# Patient Record
Sex: FEMALE | Race: BLACK OR AFRICAN AMERICAN | Employment: STUDENT | URBAN - METROPOLITAN AREA
[De-identification: names, ages, dates, MRNs, and addresses within clinical notes are randomized per-mention and may not be internally consistent; named-entity substitution may affect disease eponyms.]

---
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
## 2024-02-21 ENCOUNTER — OFFICE VISIT (OUTPATIENT)
Dept: INTERNAL MEDICINE CLINIC | Facility: CLINIC | Age: 19
End: 2024-02-21

## 2024-02-21 VITALS
BODY MASS INDEX: 43.06 KG/M2 | DIASTOLIC BLOOD PRESSURE: 77 MMHG | SYSTOLIC BLOOD PRESSURE: 116 MMHG | HEART RATE: 86 BPM | WEIGHT: 234 LBS | HEIGHT: 62 IN

## 2024-02-21 DIAGNOSIS — E03.8 SUBCLINICAL HYPOTHYROIDISM: ICD-10-CM

## 2024-02-21 DIAGNOSIS — Z23 NEED FOR VACCINATION: ICD-10-CM

## 2024-02-21 DIAGNOSIS — Z00.00 PHYSICAL EXAM, ANNUAL: Primary | ICD-10-CM

## 2024-02-21 DIAGNOSIS — D50.8 IRON DEFICIENCY ANEMIA SECONDARY TO INADEQUATE DIETARY IRON INTAKE: ICD-10-CM

## 2024-02-21 PROCEDURE — 3008F BODY MASS INDEX DOCD: CPT | Performed by: INTERNAL MEDICINE

## 2024-02-21 PROCEDURE — 90471 IMMUNIZATION ADMIN: CPT | Performed by: INTERNAL MEDICINE

## 2024-02-21 PROCEDURE — 99395 PREV VISIT EST AGE 18-39: CPT | Performed by: INTERNAL MEDICINE

## 2024-02-21 PROCEDURE — 3074F SYST BP LT 130 MM HG: CPT | Performed by: INTERNAL MEDICINE

## 2024-02-21 PROCEDURE — 90716 VAR VACCINE LIVE SUBQ: CPT | Performed by: INTERNAL MEDICINE

## 2024-02-21 PROCEDURE — 3078F DIAST BP <80 MM HG: CPT | Performed by: INTERNAL MEDICINE

## 2024-02-21 NOTE — PROGRESS NOTES
Alina Resendiz is a 18 year old female.  Chief Complaint   Patient presents with    Physical       HPI:   Pt comes for her annual physical   C/c annual physical   C/o still trying to find a therapist   She is not sure if she has heavy periods and not sure if she gets clots \" maybe once in a while \"        HISTORY  2/2023 VISIT   new pt -referred by her mom jeremiah hui   C/c annual physical  C/o cramps with her periods and takes ibuprofen which helps --advised to take it after meals   Gets nervous at times but not panic attacks  ? Triggers   Notices it more in crowds or when she is the center of attention  Not sexually active   Today sometimes sneezes a lot but mom was wondering if she should go for allergy testing and after discussion we will hold off on that for now        Will start college for [employer] , does after school matters      PMH  Prediabetes     No current outpatient medications on file.      Past Medical History:   Diagnosis Date    Anxiety     Depression       No past surgical history on file.   Social History:  Social History     Socioeconomic History    Marital status: Single   Tobacco Use    Smoking status: Never     Passive exposure: Never   Vaping Use    Vaping Use: Never used   Substance and Sexual Activity    Alcohol use: Never    Drug use: Never    Sexual activity: Never   Social History Narrative    Lives with mom and  Sister and older brother 21 years,         Hamster    Guinea pig belong to to sister    fish            Mom works at [employer] for 21 years        REVIEW OF SYSTEMS:   GENERAL HEALTH: No fevers, chills, sweats, fatigue  VISION: No recent vision problems, blurry vision or double vision  HEENT: No decreased hearing ear pain nasal congestion or sore throat  SKIN: denies any unusual skin lesions or rashes  RESPIRATORY: denies shortness of breath, cough, wheezing  CARDIOVASCULAR: denies chest pain on exertion, + palpitations- when nervous ,no swelling in feet  GI: denies abdominal  pain and denies heartburn, nausea or vomiting  : No Pain on urination, change in the color of urine, discharge, urinating frequently  MUS: No back pain, joint pain, muscle pain  NEURO: denies headaches ,+ anxiety> depression    EXAM:   /77 (BP Location: Right arm, Patient Position: Sitting, Cuff Size: large)   Pulse 86   Ht 5' 2\" (1.575 m)   Wt 234 lb (106.1 kg)   LMP 02/01/2024   BMI 42.80 kg/m²   GENERAL: well developed, well nourished,in no apparent distress  SKIN: no rashes,no suspicious lesions  HEENT: atraumatic, normocephalic,ears and throat are clear, no frontal or maxillary sinus tenderness, pupils equal reactive to light bilaterally, extract muscles intact  NECK: supple,no adenopathy, nontender   LUNGS: clear to auscultation, no wheeze  CARDIO: RRR without murmur  GI: good BS's,no masses or tenderness  EXTREMITIES: no cyanosis, or edema    ASSESSMENT AND PLAN:   Diagnoses and all orders for this visit:    Physical exam, annual    Iron deficiency anemia secondary to inadequate dietary iron intake  -     Iron And Tibc [E]; Future  -     Ferritin [E]; Future    Need for vaccination  -     Varicella (Chicken Pox) Vaccine    Subclinical hypothyroidism    Advised patient to watch her sheets exercise, seatbelt use no texting driving, sunscreen use advised  Will recheck labs    Preventive medicine   Labs 12/2023      The patient indicates understanding of these issues and agrees to the plan.  No follow-ups on file.

## 2024-07-08 ENCOUNTER — TELEPHONE (OUTPATIENT)
Dept: INTERNAL MEDICINE CLINIC | Facility: CLINIC | Age: 19
End: 2024-07-08

## 2024-07-08 NOTE — TELEPHONE ENCOUNTER
Fatou at [facility]  is requesting referral.     Name of specialist and specialty department : Behavioral Health Specialist   Reason for visit with the specialist: Suicidal Ideation   Address of the specialist office: N/A   Appointment date: N/A         CSS informed patient the turnaround time for referral is 5-7 business days.  Patient was informed to check their [employer] account for referral status.     Patient is scheduled with Adele Nam 7/13 by Fatou at [facility].

## 2024-07-09 NOTE — TELEPHONE ENCOUNTER
Spoke with Fatou, , patient will be seeing Keiry Nam on 7/13/2024 at [facility].    per Fatou she wants to get the referral going for Behavioral Health Specialist for Suicidal Ideation.  This referral will be as an outpatient.  Please advise

## 2024-07-09 NOTE — TELEPHONE ENCOUNTER
Hello     Behavioral health is self referred. They need to call # on back of insurance card. We do not get authorization for behavioral health.     Thank you,  Victoria  Referral specialist

## 2024-07-10 NOTE — TELEPHONE ENCOUNTER
Patient notified with understanding.  Unable to reach provided extension at [facility].  Patient will contact insurance.

## 2024-08-19 ENCOUNTER — TELEMEDICINE (OUTPATIENT)
Dept: INTERNAL MEDICINE CLINIC | Facility: CLINIC | Age: 19
End: 2024-08-19
Payer: COMMERCIAL

## 2024-08-19 DIAGNOSIS — F32.A DEPRESSION, UNSPECIFIED DEPRESSION TYPE: Primary | ICD-10-CM

## 2024-08-19 RX ORDER — BUPROPION HYDROCHLORIDE 150 MG/1
150 TABLET ORAL DAILY
Qty: 90 TABLET | Refills: 0 | Status: SHIPPED | OUTPATIENT
Start: 2024-08-19

## 2024-08-19 NOTE — PROGRESS NOTES
Patient ID: Alina Resendiz is a 19 year old female.  No chief complaint on file.         HISTORY OF PRESENT ILLNESS:   Patient presents for above.  This visit is conducted using Telemedicine with live, interactive video and audio.  C/c follow up  C/o [facility] therapist adilia hernandezed pt and left msg --azrald like to speak to therapist , she doesn't have one   Feels well without any thoughts of harming herself or others but ran out of her bupropion last week so needs a refill          Review of Systems   MEDICAL HISTORY:     Past Medical History:    Anxiety    Depression       No past surgical history on file.      Current Outpatient Medications:     buPROPion  MG Oral Tablet 24 Hr, Take 1 tablet (150 mg total) by mouth daily., Disp: 90 tablet, Rfl: 0    Allergies:No Known Allergies    Social History     Socioeconomic History    Marital status: Single     Spouse name: Not on file    Number of children: Not on file    Years of education: Not on file    Highest education level: Not on file   Occupational History    Not on file   Tobacco Use    Smoking status: Never     Passive exposure: Never    Smokeless tobacco: Not on file   Vaping Use    Vaping status: Never Used   Substance and Sexual Activity    Alcohol use: Never    Drug use: Never    Sexual activity: Never   Other Topics Concern    Not on file   Social History Narrative    Lives with mom and  Sister and older brother 21 years,         Hamster    Guinea pig belong to to sister    fish            Mom works at [employer] for 21 years     Social Determinants of Health     Financial Resource Strain: Low Risk  (7/3/2024)    Received from [facility]    Overall Financial Resource Strain (CARDIA)     Difficulty of Paying Living Expenses: Not very hard   Food Insecurity: No Food Insecurity (7/3/2024)    Received from [facility]    Hunger Vital Sign     Worried About Running Out of Food in the Last Year: Never true     Ran Out of  Food in the Last Year: Never true   Transportation Needs: No Transportation Needs (7/3/2024)    Received from [facility]    PRAPARE - Transportation     Lack of Transportation (Medical): No     Lack of Transportation (Non-Medical): No   Physical Activity: Not on file   Stress: Not on file   Social Connections: Not on file   Housing Stability: Low Risk  (7/3/2024)    Received from [facility]    Housing Stability Vital Sign     Unable to Pay for Housing in the Last Year: No     Number of Times Moved in the Last Year: 1     Homeless in the Last Year: No       PHYSICAL EXAM:   Unable to perform vitals or do physical exam as this is a virtual video visit.  Patient speaking complete sentences without any conversational dyspnea or respiratory distress  No coughing heard    ASSESSMENT/PLAN:   1. Depression, unspecified depression type  - buPROPion  MG Oral Tablet 24 Hr; Take 1 tablet (150 mg total) by mouth daily.  Dispense: 90 tablet; Refill: 0  - OP REFERRAL TO [facility]    Refer to [facility] since she has a therapist will restart the bupropion but at a lower dose and can always adjust medications as needed and reminded patient to get blood work done that has been ordered since December    No follow-ups on file.    Time spent on encounter  9 minutes   Video time 9 minutes   Documentation time 9 minutes     Alina Resendiz understands video evaluation is not a substitute for face-to-face examination or emergency care. Patient advised to go to ER or call 911 for worsening symptoms or acute distress.     Telehealth outside of [facility]  Telehealth Verbal Consent   I conducted a telehealth visit with Alina Resendiz today, 08/19/24, which was completed using two-way, real-time interactive audio and video communication. This has been done in good ravinder to provide continuity of care in the best interest of the provider-patient relationship, due to the COVID -19  public health crisis/national emergency where restrictions of face-to-face office visits are ongoing. Every conscious effort was taken to allow for sufficient and adequate time to complete the visit.  The patient was made aware of the limitations of the telehealth visit, including treatment limitations as no physical exam could be performed.  The patient was advised to call 911 or to go to the ER in case there was an emergency.  The patient was also advised of the potential privacy & security concerns related to the telehealth platform.   The patient was made aware of where to find [facility]'s notice of privacy practices, telehealth consent form and other related consent forms and documents.  which are located on the [facility] website. The patient verbally agreed to telehealth consent form, related consents and the risks discussed.    Lastly, the patient confirmed that they were in [place].   Included in this visit, time may have been spent reviewing labs, medications, radiology tests and decision making. Appropriate medical decision-making and tests are ordered as detailed in the plan of care above.  Coding/billing information is submitted for this visit based on complexity of care and/or time spent for the visit.    This note was prepared using Dragon Medical voice recognition dictation software. As a result errors may occur. When identified these errors have been corrected. While every attempt is made to correct errors during dictation discrepancies may still exist.    Anabelle Orozco MD  8/19/2024

## 2025-01-28 ENCOUNTER — TELEMEDICINE (OUTPATIENT)
Dept: INTERNAL MEDICINE CLINIC | Facility: CLINIC | Age: 20
End: 2025-01-28
Payer: COMMERCIAL

## 2025-01-28 DIAGNOSIS — M25.531 RIGHT WRIST PAIN: Primary | ICD-10-CM

## 2025-01-28 PROCEDURE — 98004 SYNCH AUDIO-VIDEO EST SF 10: CPT | Performed by: INTERNAL MEDICINE

## 2025-01-28 NOTE — PROGRESS NOTES
Patient ID: Alina Resendiz is a 19 year old female.  No chief complaint on file.         HISTORY OF PRESENT ILLNESS:   Patient presents for above.  This visit is conducted using Telemedicine with live, interactive video and audio.  C/c follow up  C/o right wrist pain x 2 mns   She is right handed   No falls trauma or injury that she is aware of   Worse -lying on it , or rotating it   No numbness or tingling she has not noticed any swelling redness      Review of Systems   Ten point review of systems otherwise negative with the exception of HPI and assessment and plan.  MEDICAL HISTORY:     Past Medical History:    Anxiety    Depression       No past surgical history on file.      Current Outpatient Medications:     buPROPion  MG Oral Tablet 24 Hr, Take 1 tablet (150 mg total) by mouth daily., Disp: 90 tablet, Rfl: 0    Allergies:Allergies[1]    Social History     Socioeconomic History    Marital status: Single     Spouse name: Not on file    Number of children: Not on file    Years of education: Not on file    Highest education level: Not on file   Occupational History    Not on file   Tobacco Use    Smoking status: Never     Passive exposure: Never    Smokeless tobacco: Not on file   Vaping Use    Vaping status: Never Used   Substance and Sexual Activity    Alcohol use: Never    Drug use: Never    Sexual activity: Never   Other Topics Concern    Not on file   Social History Narrative    Lives with mom and  Sister and older brother 21 years,         Hamster    Guinea pig belong to to sister    fish            Mom works at  for 21 years     Social Drivers of Health     Financial Resource Strain: Low Risk  (7/3/2024)    Received from [facility]    Overall Financial Resource Strain (CARDIA)     Difficulty of Paying Living Expenses: Not very hard   Food Insecurity: No Food Insecurity (7/3/2024)    Received from [facility]    Hunger Vital Sign     Worried About  Running Out of Food in the Last Year: Never true     Ran Out of Food in the Last Year: Never true   Transportation Needs: No Transportation Needs (7/3/2024)    Received from [facility]    PRAPARE - Transportation     Lack of Transportation (Medical): No     Lack of Transportation (Non-Medical): No   Physical Activity: Not on file   Stress: Not on file   Social Connections: Not on file   Housing Stability: Low Risk  (7/3/2024)    Received from [facility]    Housing Stability Vital Sign     Unable to Pay for Housing in the Last Year: No     Number of Times Moved in the Last Year: 1     Homeless in the Last Year: No       PHYSICAL EXAM:   Unable to perform vitals or do physical exam as this is a virtual video visit.  Patient appears alert and oriented x 3, no acute distress  Patient speaking complete sentences without any conversational dyspnea or respiratory distress  No coughing heard  Looks like the visit normal in anatomy without any redness or swelling and has good range of motion although she does feel some discomfort    ASSESSMENT/PLAN:   1. Right wrist pain  Ibuprofen 200 mg twice a day after meals for the next 7 to 10 days  Advised to ice it at night  Hold off on xrays   If not better after one week - try a wrist brace x one week   If not better after that then call       No follow-ups on file.    Time spent on encounter  9 minutes   Video time 9 minutes   Documentation time 9 minutes     Alina Resendiz understands video evaluation is not a substitute for face-to-face examination or emergency care. Patient advised to go to ER or call 911 for worsening symptoms or acute distress.     Telehealth outside of [facility]  Telehealth Verbal Consent   I conducted a telehealth visit with Alina Resendiz today, 01/28/25, which was completed using two-way, real-time interactive audio and video communication. This has been done in good ravinder to provide continuity of care in the  best interest of the provider-patient relationship, due to the COVID -19 public health crisis/national emergency where restrictions of face-to-face office visits are ongoing. Every conscious effort was taken to allow for sufficient and adequate time to complete the visit.  The patient was made aware of the limitations of the telehealth visit, including treatment limitations as no physical exam could be performed.  The patient was advised to call 911 or to go to the ER in case there was an emergency.  The patient was also advised of the potential privacy & security concerns related to the telehealth platform.   The patient was made aware of where to find [facility]'s notice of privacy practices, telehealth consent form and other related consent forms and documents.  which are located on the [facility] website. The patient verbally agreed to telehealth consent form, related consents and the risks discussed.    Lastly, the patient confirmed that they were in [place].   Included in this visit, time may have been spent reviewing labs, medications, radiology tests and decision making. Appropriate medical decision-making and tests are ordered as detailed in the plan of care above.  Coding/billing information is submitted for this visit based on complexity of care and/or time spent for the visit.    This note was prepared using Dragon Medical voice recognition dictation software. As a result errors may occur. When identified these errors have been corrected. While every attempt is made to correct errors during dictation discrepancies may still exist.    Anabelle Orozco MD  1/28/2025         [1] No Known Allergies

## (undated) NOTE — LETTER
VACCINE ADMINISTRATION RECORD  PARENT / GUARDIAN APPROVAL  Date: 2021  Vaccine administered to: Jolie Neely     : 2005    MRN: [id number]    A copy of the appropriate Centers for Disease Control and Prevention Vaccine Information statement